# Patient Record
Sex: FEMALE | Race: WHITE
[De-identification: names, ages, dates, MRNs, and addresses within clinical notes are randomized per-mention and may not be internally consistent; named-entity substitution may affect disease eponyms.]

---

## 2018-05-09 RX ADMIN — LIDOCAINE HYDROCHLORIDE ONE ML: 20 JELLY TOPICAL at 07:34

## 2018-05-10 ENCOUNTER — HOSPITAL ENCOUNTER (OUTPATIENT)
Dept: HOSPITAL 17 - PHSDC | Age: 68
Discharge: HOME | End: 2018-05-10
Attending: OPTOMETRIST
Payer: COMMERCIAL

## 2018-05-10 VITALS
RESPIRATION RATE: 16 BRPM | SYSTOLIC BLOOD PRESSURE: 149 MMHG | HEART RATE: 67 BPM | OXYGEN SATURATION: 98 % | DIASTOLIC BLOOD PRESSURE: 75 MMHG

## 2018-05-10 VITALS — WEIGHT: 125.66 LBS | HEIGHT: 62 IN | BODY MASS INDEX: 23.12 KG/M2

## 2018-05-10 VITALS — TEMPERATURE: 98.2 F

## 2018-05-10 DIAGNOSIS — H26.9: Primary | ICD-10-CM

## 2018-05-10 PROCEDURE — 00142 ANES PX ON EYE LENS SURGERY: CPT

## 2018-05-10 PROCEDURE — 66984 XCAPSL CTRC RMVL W/O ECP: CPT

## 2018-05-10 PROCEDURE — V2632 POST CHMBR INTRAOCULAR LENS: HCPCS

## 2018-05-10 RX ADMIN — TROPICAMIDE SCH DROP: 10 SOLUTION/ DROPS OPHTHALMIC at 07:02

## 2018-05-10 RX ADMIN — FLURBIPROFEN SODIUM SCH DROP: 0.3 SOLUTION/ DROPS OPHTHALMIC at 07:02

## 2018-05-10 RX ADMIN — PHENYLEPHRINE HYDROCHLORIDE SCH DROP: 100 SOLUTION/ DROPS OPHTHALMIC at 07:02

## 2018-05-10 RX ADMIN — CYCLOPENTOLATE HYDROCHLORIDE SCH DROP: 10 SOLUTION/ DROPS OPHTHALMIC at 06:47

## 2018-05-10 RX ADMIN — PHENYLEPHRINE HYDROCHLORIDE SCH DROP: 100 SOLUTION/ DROPS OPHTHALMIC at 06:47

## 2018-05-10 RX ADMIN — CYCLOPENTOLATE HYDROCHLORIDE SCH DROP: 10 SOLUTION/ DROPS OPHTHALMIC at 06:52

## 2018-05-10 RX ADMIN — PHENYLEPHRINE HYDROCHLORIDE SCH DROP: 100 SOLUTION/ DROPS OPHTHALMIC at 06:57

## 2018-05-10 RX ADMIN — FLURBIPROFEN SODIUM SCH DROP: 0.3 SOLUTION/ DROPS OPHTHALMIC at 06:57

## 2018-05-10 RX ADMIN — PHENYLEPHRINE HYDROCHLORIDE SCH DROP: 100 SOLUTION/ DROPS OPHTHALMIC at 06:52

## 2018-05-10 RX ADMIN — FLURBIPROFEN SODIUM SCH DROP: 0.3 SOLUTION/ DROPS OPHTHALMIC at 06:47

## 2018-05-10 RX ADMIN — TROPICAMIDE SCH DROP: 10 SOLUTION/ DROPS OPHTHALMIC at 06:52

## 2018-05-10 RX ADMIN — CYCLOPENTOLATE HYDROCHLORIDE SCH DROP: 10 SOLUTION/ DROPS OPHTHALMIC at 07:02

## 2018-05-10 RX ADMIN — TROPICAMIDE SCH DROP: 10 SOLUTION/ DROPS OPHTHALMIC at 06:47

## 2018-05-10 RX ADMIN — FLURBIPROFEN SODIUM SCH DROP: 0.3 SOLUTION/ DROPS OPHTHALMIC at 06:52

## 2018-05-10 RX ADMIN — CYCLOPENTOLATE HYDROCHLORIDE SCH DROP: 10 SOLUTION/ DROPS OPHTHALMIC at 06:57

## 2018-05-10 RX ADMIN — TROPICAMIDE SCH DROP: 10 SOLUTION/ DROPS OPHTHALMIC at 06:57

## 2018-05-10 NOTE — MP
cc:

Tyler Hernández MD

****

 

 

DATE OF OPERATION:

05/10/2018

 

[facility] NUMBER:

[id number].

 

PREOPERATIVE DIAGNOSIS:

Visually significant cataract, right eye.

 

POSTOPERATIVE DIAGNOSIS:

Visually significant cataract, right eye.

 

OPERATION:

Phacoemulsification with posterior chamber lens implantation, right 

eye.

 

SURGEON:

Tyler Hernández MD

 

ANESTHESIA:

Topical with MAC.

 

COMPLICATIONS:

None.

 

PROCEDURE:

After informed consent was obtained, the patient was brought into the 

operative suite and placed on appropriate monitors by the Anesthesia 

Service.  The patient had been given dilating drops and topical 

lidocaine gel in the holding area.

 

The patient's operative eye was then prepped and draped in the usual 

sterile fashion.  A wire lid speculum was placed.  Further 2% 

lidocaine was then dropped on the cornea prior to beginning the 

procedure.

 

A paracentesis incision was made in the peripheral cornea with a 1 mm 

rommel keratome.  The anterior chamber was filled with viscoelastic. 

The anterior chamber was then entered through a stepped, clear corneal

incision using a sharp 3 mm rommel keratome.  A circular tear 

capsulorrhexis was then made with a bent needle cystitome.  Following 

hydrodissection of the lens nucleus with balance saline, 

phaco-emulsification of the nucleus was performed using a modified 

chopping technique.  The remaining cortex was removed with 

irrigation/aspiration.  The prior two procedures were both performed 

using the handpieces of the [employer] phaco unit.

 

The capsular bag was then filled with viscoelastic.  The intraocular 

lens was then injected into the capsular bag and positioned.  The type

of intraocular lens and its power can be found elsewhere in this 

chart.  The remaining viscoelastic was then removed from the anterior 

chamber with the IA handpiece.

 

The anterior chamber was reformed with balanced saline.  The wound was

then closed securely with stromal hydration.  It was found to be 

watertight to an intraocular pressure of at least 30 mmHg by 

palpation.  A small amount of balanced salt solution was then removed 

through the paracentesis site and the intraocular pressure at the end 

of the case was approximately 20 by palpation.

All drapes were then removed.  TobraDex ointment was then placed in 

the eye, which was closed beneath a semi-pressure patch dressing.

 

The patient tolerated this procedure well and left the operating room 

awake and alert.  The patient is to follow-up in my office in the 

morning.

My standard topical clear corneal Phacoemulsification if you want to 

pull it up will fill in the blanks first her [id number], her [facility] [id number] field with the [employer] topical clear corneal phaco 

were filling in.

 

 

 

__________________________________

MD MELINA De Paz/EZIO

D: 05/10/2018, 09:38 AM

T: 05/10/2018, 09:48 AM

Visit #: [id number]

Job #: [id number]

## 2018-06-21 ENCOUNTER — HOSPITAL ENCOUNTER (OUTPATIENT)
Dept: HOSPITAL 17 - PHSDC | Age: 68
Discharge: HOME | End: 2018-06-21
Attending: OPTOMETRIST
Payer: COMMERCIAL

## 2018-06-21 VITALS — HEIGHT: 62 IN | BODY MASS INDEX: 22.7 KG/M2 | WEIGHT: 123.37 LBS

## 2018-06-21 VITALS
OXYGEN SATURATION: 98 % | HEART RATE: 69 BPM | RESPIRATION RATE: 14 BRPM | SYSTOLIC BLOOD PRESSURE: 127 MMHG | DIASTOLIC BLOOD PRESSURE: 63 MMHG

## 2018-06-21 VITALS — TEMPERATURE: 98 F

## 2018-06-21 DIAGNOSIS — H25.812: Primary | ICD-10-CM

## 2018-06-21 PROCEDURE — 00142 ANES PX ON EYE LENS SURGERY: CPT

## 2018-06-21 PROCEDURE — V2632 POST CHMBR INTRAOCULAR LENS: HCPCS

## 2018-06-21 PROCEDURE — 66984 XCAPSL CTRC RMVL W/O ECP: CPT

## 2018-06-21 RX ADMIN — TROPICAMIDE SCH DROP: 10 SOLUTION/ DROPS OPHTHALMIC at 06:55

## 2018-06-21 RX ADMIN — FLURBIPROFEN SODIUM SCH DROP: 0.3 SOLUTION/ DROPS OPHTHALMIC at 06:45

## 2018-06-21 RX ADMIN — TROPICAMIDE SCH DROP: 10 SOLUTION/ DROPS OPHTHALMIC at 06:50

## 2018-06-21 RX ADMIN — TROPICAMIDE SCH DROP: 10 SOLUTION/ DROPS OPHTHALMIC at 06:45

## 2018-06-21 RX ADMIN — TROPICAMIDE SCH DROP: 10 SOLUTION/ DROPS OPHTHALMIC at 07:00

## 2018-06-21 RX ADMIN — PHENYLEPHRINE HYDROCHLORIDE SCH DROP: 100 SOLUTION/ DROPS OPHTHALMIC at 07:00

## 2018-06-21 RX ADMIN — PHENYLEPHRINE HYDROCHLORIDE SCH DROP: 100 SOLUTION/ DROPS OPHTHALMIC at 06:50

## 2018-06-21 RX ADMIN — FLURBIPROFEN SODIUM SCH DROP: 0.3 SOLUTION/ DROPS OPHTHALMIC at 06:55

## 2018-06-21 RX ADMIN — CYCLOPENTOLATE HYDROCHLORIDE SCH DROP: 10 SOLUTION/ DROPS OPHTHALMIC at 06:55

## 2018-06-21 RX ADMIN — FLURBIPROFEN SODIUM SCH DROP: 0.3 SOLUTION/ DROPS OPHTHALMIC at 07:00

## 2018-06-21 RX ADMIN — PHENYLEPHRINE HYDROCHLORIDE SCH DROP: 100 SOLUTION/ DROPS OPHTHALMIC at 06:45

## 2018-06-21 RX ADMIN — FLURBIPROFEN SODIUM SCH DROP: 0.3 SOLUTION/ DROPS OPHTHALMIC at 06:50

## 2018-06-21 RX ADMIN — PHENYLEPHRINE HYDROCHLORIDE SCH DROP: 100 SOLUTION/ DROPS OPHTHALMIC at 06:55

## 2018-06-21 RX ADMIN — CYCLOPENTOLATE HYDROCHLORIDE SCH DROP: 10 SOLUTION/ DROPS OPHTHALMIC at 06:50

## 2018-06-21 RX ADMIN — CYCLOPENTOLATE HYDROCHLORIDE SCH DROP: 10 SOLUTION/ DROPS OPHTHALMIC at 07:00

## 2018-06-21 RX ADMIN — CYCLOPENTOLATE HYDROCHLORIDE SCH DROP: 10 SOLUTION/ DROPS OPHTHALMIC at 06:45

## 2018-06-21 NOTE — MP
cc:

Tyler Hernández MD

****

 

 

DATE OF OPERATION:

06/21/2018

 

[facility] NUMBER:

[id number]

 

PREOPERATIVE DIAGNOSIS:

Visually significant cataract, left eye.

 

POSTOPERATIVE DIAGNOSIS:

Visually significant cataract, left eye.

 

OPERATION:

Phacoemulsification with posterior chamber lens implantation, left 

eye.

 

SURGEON:

Tyler Hernández MD

 

ANESTHESIA:

Topical with MAC.

 

COMPLICATIONS:

None.

 

PROCEDURE:

After informed consent was obtained, the patient was brought into the 

operative suite and placed on appropriate monitors by the Anesthesia 

Service.  The patient had been given dilating drops and topical 

lidocaine gel in the holding area.

 

The patient's operative eye was then prepped and draped in the usual 

sterile fashion.  A wire lid speculum was placed.  Further 2% 

lidocaine was then dropped on the cornea prior to beginning the 

procedure.

 

A paracentesis incision was made in the peripheral cornea with a 1 mm 

rommel keratome.  The anterior chamber was filled with viscoelastic. 

The anterior chamber was then entered through a stepped, clear corneal

incision using a sharp 3 mm rommel keratome.  A circular tear 

capsulorrhexis was then made with a bent needle cystitome.  Following 

hydrodissection of the lens nucleus with balance saline, 

phaco-emulsification of the nucleus was performed using a modified 

chopping technique.  The remaining cortex was removed with 

irrigation/aspiration.  The prior two procedures were both performed 

using the handpieces of the [employer] phaco unit.

 

The capsular bag was then filled with viscoelastic.  The intraocular 

lens was then injected into the capsular bag and positioned.  The type

of intraocular lens and its power can be found elsewhere in this 

chart.  The remaining viscoelastic was then removed from the anterior 

chamber with the IA handpiece.

 

The anterior chamber was reformed with balanced saline.  The wound was

then closed securely with stromal hydration.  It was found to be 

watertight to an intraocular pressure of at least 30 mmHg by 

palpation.  A small amount of balanced salt solution was then removed 

through the paracentesis site and the intraocular pressure at the end 

of the case was approximately 20 by palpation.

All drapes were then removed.  TobraDex ointment was then placed in 

the eye, which was closed beneath a semi-pressure patch dressing.

 

The patient tolerated this procedure well and left the operating room 

awake and alert.  The patient is to follow-up in my office in the 

morning.

 

 

 

 

 

 

__________________________________

Tyler MD VIRAL Markham

D: 06/21/2018, 10:32 AM

T: 06/21/2018, 10:40 AM

Visit #: [id number]

Job #: [id number]